# Patient Record
(demographics unavailable — no encounter records)

---

## 2024-10-23 NOTE — HISTORY OF PRESENT ILLNESS
[(Patient denies any chest pain, claudication, dyspnea on exertion, orthopnea, palpitations or syncope)] : Patient denies any chest pain, claudication, dyspnea on exertion, orthopnea, palpitations or syncope [Aortic Stenosis] : no aortic stenosis [Atrial Fibrillation] : no atrial fibrillation [Coronary Artery Disease] : no coronary artery disease [Recent Myocardial Infarction] : no recent myocardial infarction [Implantable Device/Pacemaker] : no implantable device/pacemaker [Asthma] : no asthma [COPD] : no COPD [Sleep Apnea] : no sleep apnea [Smoker] : not a smoker [Self] : no previous adverse anesthesia reaction [Chronic Anticoagulation] : no chronic anticoagulation [Chronic Kidney Disease] : no chronic kidney disease [Diabetes] : no diabetes [FreeTextEntry1] : R knee replacement  [FreeTextEntry2] : 10/30/2024 [FreeTextEntry3] : Dr. Palmer [FreeTextEntry4] : JIMBO VILLAGOMEZ is a 59 year F who presents for preop PMHx HTN, anxiety, b/l knee OA, SNHL b/l Feels well overall S/p PST 10/16  [FreeTextEntry8] : able to perform >4 METS

## 2024-10-23 NOTE — ASSESSMENT
[FreeTextEntry4] : -planned for R knee replacement 10/30 -PST labs 10/16 reviewed- cbc, bmp, a1c, ua/ucx wnl -ekg today SR -no prior adverse effects to anesthesia -able to perform >4METS -RCRI = 0 -to avoid asa, nsaids and vitamins for 1 week prior to surgery  Patient is medically optimized for planned procedure with routine hemodynamic monitoring

## 2025-01-15 NOTE — DISCUSSION/SUMMARY
[FreeTextEntry1] : In a summary Amairani Alejandro is a middle aged female with Hypertension, controlled. Continue Metoprolol and 2 gm sodium diet. LDL goal less than 130 g/dL. Start exercises.  Continue healthy lifestyle. Follow up in 6 months..

## 2025-01-15 NOTE — HISTORY OF PRESENT ILLNESS
[FreeTextEntry1] : Amairani Alejandro is a 58-year-old female with Hypertension comes for follow up visit. Denies any chest pains or palpitations.  No Shortness of breath on exertion. Compliant to medications and diet. Physically active.

## 2025-01-15 NOTE — REVIEW OF SYSTEMS
[Joint Pain] : joint pain [Under Stress] : under stress [Negative] : Respiratory [Blurry Vision] : no blurred vision [Dyspnea on exertion] : not dyspnea during exertion [Chest Discomfort] : no chest discomfort [Lower Ext Edema] : no extremity edema [Palpitations] : no palpitations [Orthopnea] : no orthopnea [PND] : no PND [Abdominal Pain] : no abdominal pain [Nausea] : no nausea [Vomiting] : no vomiting [Heartburn] : no heartburn [Rash] : no rash [Itching] : no itching [Dizziness] : no dizziness [Tremor] : no tremor was seen [Numbness (Hypoesthesia)] : no numbness [Tingling (Paresthesia)] : no tingling [Confusion] : no confusion was observed [Depression] : no depression [Anxiety] : no anxiety [Easy Bleeding] : no tendency for easy bleeding [Easy Bruising] : no tendency for easy bruising

## 2025-03-19 NOTE — HISTORY OF PRESENT ILLNESS
[de-identified] : Patient is a 59 year old female who presents today for an initial evaluation of chronic low back pain. She states that she has had neck pain and midback pain recently. Denies UE sxs. She reports that she has issues with dropping objects, but denies issues with balance. She has left LE pain. She would be able to walk five blocks.

## 2025-03-19 NOTE — PHYSICAL EXAM
[de-identified] :  Cervical Physical Exam   Gait - Normal   Station - Normal   Sagittal balance - Normal   Compensatory mechanism? - None   Horizontal gaze - Maintained   difficulty with toe/heel walk   Reflexes Biceps - Normal Triceps - Normal Brachioradialis - Normal Patellar - Normal Gastroc - Normal Clonus -No   Hoffmans - pos on the right   Shoulder Exam - Normal   Spurlings - None   Wrist Pulses -2+ radial/ulnar   Foot Pulses -2+ DP/PT   Cervical range of motion - Normal   Sensation C5-T1 sensation intact to light touch bilaterally   L1-S1 sensation intact to light touch bilaterally   Motor   Deltoid Biceps Triceps WF WE IO  Right 3+/5 5/5 5/5 5/5 5/5 5/5 5/5 Left 3+/5 5/5 5/5 5/5 5/5 5/5 5/5   IP Quad HS TA Gastroc EHL Right 3+/5 5/5 5/5 5/5 5/5 5/5 Left 3+/5 5/5 5/5 5/5 5/5 5/5 [de-identified] : scoli rads facet arthropathy disc degeneration  L3-L4, L4-L5  Spondylolisthesis degenerative scoliosis SVA wnl severe arthritis bilateral hips

## 2025-03-19 NOTE — ASSESSMENT
[FreeTextEntry1] : I had a lengthy discussion with the patient in regards to their treatment plan and diagnosis.  They do have objective weakness findings on my exam.  Their symptoms have persisted despite the conservative management they have attempted thus far.  As a result I would like to proceed with a cervical and lumbar MRI.  In tandem with this they should begin a physical therapy/home therapy program.  The patient can take Tizanidine and Tylenol as needed for pain control if medically able to.  I will have the patient follow-up in 3 to 4 weeks for repeat clinical evaluation.  I encouraged them to follow-up sooner if their symptoms worsen or change in any way.

## 2025-05-19 NOTE — PHYSICAL EXAM
[de-identified] :  Cervical Physical Exam   Gait - Normal   Station - Normal   Sagittal balance - Normal   Compensatory mechanism? - None   Horizontal gaze - Maintained     Reflexes Biceps - Normal Triceps - Normal Brachioradialis - Normal Patellar - Normal Gastroc - Normal Clonus -No   Hoffmans - pos on the right   Shoulder Exam - Normal   Spurlings - None   Wrist Pulses -2+ radial/ulnar   Foot Pulses -2+ DP/PT   Cervical range of motion - Normal   Sensation C5-T1 sensation intact to light touch bilaterally   L1-S1 sensation intact to light touch bilaterally   Motor   Deltoid Biceps Triceps WF WE IO  Right 3+/5 5/5 5/5 5/5 5/5 5/5 5/5 Left 3+/5 5/5 5/5 5/5 5/5 5/5 5/5   IP Quad HS TA Gastroc EHL Right 3+/5 5/5 5/5 5/5 5/5 5/5 Left 3+/5 5/5 5/5 5/5 5/5 5/5 [de-identified] : lumbar MRI moderate lateral recess stenosis at L4-L5  cervical MRI no critical areas of stenosis  previous imaging: scoli rads facet arthropathy disc degeneration  L3-L4, L4-L5  Spondylolisthesis degenerative scoliosis SVA wnl severe arthritis bilateral hips

## 2025-05-19 NOTE — HISTORY OF PRESENT ILLNESS
[de-identified] : Patient is a 59 year old female who presents today for an evaluation of chronic low back pain. She reports that she has decreased pain from her previous visit. She has been attending physical therapy which has been helpful. She states that she has neck pain, low back pain, and left LE pain. She is scheduled for a left knee replacement.  03.19.25 Patient is a 59 year old female who presents today for an initial evaluation of chronic low back pain. She states that she has had neck pain and midback pain recently. Denies UE sxs. She reports that she has issues with dropping objects, but denies issues with balance. She has left LE pain. She would be able to walk five blocks.

## 2025-05-19 NOTE — ADDENDUM
[FreeTextEntry1] :  I, Luna Quijano, acted solely as a scribe for Dr. Jared Duarte MD on this date 05/19/2025    All medical record entries made by the Scribe were at my, Dr. Jared Duarte MD., direction and personally dictated by me on 05/19/2025 . I have reviewed the chart and agree that the record accurately reflects my personal performance of the history, physical exam, assessment and plan. I have also personally directed, reviewed, and agreed with the chart.

## 2025-05-19 NOTE — ASSESSMENT
[FreeTextEntry1] :  I had a lengthy discussion with the patient in regards to treatment plan and diagnosis. There are no red flag findings on imaging nor are there any red flag findings on clinical exams.  Therefore we will proceed with a course of conservative treatment. This would include physical therapy/home exercise program, Tylenol, NSAIDs as medically indicated.  The patient will follow up with me as needed.  I encouraged the patient to follow-up sooner if there are any new or worsening symptoms.

## 2025-06-04 NOTE — ASSESSMENT
[Vaccines Reviewed] : Immunizations reviewed today. Please see immunization details in the vaccine log within the immunization flowsheet.  [FreeTextEntry1] : Health Care Maintenance - well visit - routine labs ordered - depression screen negative - ekg- follows with cardio Dr. Montelongo - pap smear 5/2024 - mammogram - colonoscopy 5/2024 GI Dr. Zavala, 1 polyp, reports recommended repeat in 5-7 years - flu vaccine- recommend annually - covid vaccines s/p 3 doses, declines further doses  - tdap >10 years, recommend - shingles vaccine- recommend - pneumonia vaccine- recommend  - advised to get annual eye exams with ophthalmology, skin exams with dermatology, and dental exams  Chest discomfort -likely muscular as positional -ekg as above -supportive care discussed  HTN -bp elevated but improved on repeat  -c/w metoprolol qd  Anxiety -advised to discuss off label ubrelvy use with neuro Dr. Rivas  Dysphagia -resolved   RTC for MCA 7/2025

## 2025-06-04 NOTE — HEALTH RISK ASSESSMENT
[Yes] : Yes [Monthly or less (1 pt)] : Monthly or less (1 point) [1 or 2 (0 pts)] : 1 or 2 (0 points) [Never (0 pts)] : Never (0 points) [No] : In the past 12 months have you used drugs other than those required for medical reasons? No [0] : 2) Feeling down, depressed, or hopeless: Not at all (0) [PHQ-2 Negative - No further assessment needed] : PHQ-2 Negative - No further assessment needed [Never] : Never [With Family] : lives with family [Employed] : employed [] :  [# Of Children ___] : has [unfilled] children [Feels Safe at Home] : Feels safe at home [Fully functional (bathing, dressing, toileting, transferring, walking, feeding)] : Fully functional (bathing, dressing, toileting, transferring, walking, feeding) [Fully functional (using the telephone, shopping, preparing meals, housekeeping, doing laundry, using] : Fully functional and needs no help or supervision to perform IADLs (using the telephone, shopping, preparing meals, housekeeping, doing laundry, using transportation, managing medications and managing finances) [Audit-CScore] : 1 [OIE1Aeouc] : 0 [MammogramDate] : 12/24 [MammogramComments] : w/ US ([facility]) [PapSmearDate] : 05/24 [ColonoscopyDate] : 05/24 [ColonoscopyComments] : GI Dr. 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

## 2025-06-04 NOTE — HISTORY OF PRESENT ILLNESS
[FreeTextEntry1] : cpe [de-identified] : JIMBO VILLAGOMEZ is a 59 year F who presents for CPE PMHx HTN, anxiety, b/l knee OA, SNHL b/l  1. L knee TKR planned for 7/30 Dr. Palmer, s/p R TKR fall 2024 and tolerated well.  2. Reports intermittent chest discomfort since this past weekend; onset as she was sitting up from the chair after getting her hair washed at the hairdresser. Worse with certain positions. Denies SOB, palpitations.  3. States she previously saw neuro Dr. Manuel Rivas who gave her a few samples of ubrelvy to trial prn for anxiety; states she was told it was off label use and reports it helped.   Otherwise feels well  Gyn Dr. [place] Cardio Dr. Montelongo

## 2025-07-14 NOTE — DISCUSSION/SUMMARY
[FreeTextEntry1] : In a summary Amairani Alejandro is a middle aged female with Hypertension, controlled. Continue Metoprolol and 2 gm sodium diet. LDL goal less than 130 g/dL. Start exercises. Echo done showed normal LV systolic function and wall motion. Echo results explained.  Continue healthy lifestyle. Follow up in 6 months.

## 2025-07-16 NOTE — HISTORY OF PRESENT ILLNESS
[(Patient denies any chest pain, claudication, dyspnea on exertion, orthopnea, palpitations or syncope)] : Patient denies any chest pain, claudication, dyspnea on exertion, orthopnea, palpitations or syncope [Aortic Stenosis] : no aortic stenosis [Atrial Fibrillation] : no atrial fibrillation [Coronary Artery Disease] : no coronary artery disease [Recent Myocardial Infarction] : no recent myocardial infarction [Implantable Device/Pacemaker] : no implantable device/pacemaker [Asthma] : no asthma [COPD] : no COPD [Sleep Apnea] : no sleep apnea [Smoker] : not a smoker [Self] : no previous adverse anesthesia reaction [Chronic Anticoagulation] : no chronic anticoagulation [Chronic Kidney Disease] : no chronic kidney disease [Diabetes] : no diabetes [FreeTextEntry1] : L knee TKR [FreeTextEntry2] : 7/30/2025 [FreeTextEntry3] : Dr. Palmer  [FreeTextEntry4] : JIMBO VILLAGOMEZ is a 59 year F who presents for preop PMHx HTN, anxiety, b/l knee OA, SNHL b/l Feels well overall  [FreeTextEntry7] : TTE 7/2025; unremarkable  [FreeTextEntry8] : able to perform >4 METS

## 2025-07-16 NOTE — ASSESSMENT
[FreeTextEntry4] : -planned for L knee TKR  7/30/2025 -labs reviewed from 6/2025, prediabetic, otherwise unremarkable  -ekg 6/2025 sinus emeka 57bpm, patient asymptomatic with no active ischemia, no decompensated heart failure, no unstable arrhythmia, and no severe stenotic valvular disease -TTE 6/2025 grossly unremarkable  -no prior adverse effects to anesthesia -able to perform >4METS -RCRI = 0 -to avoid asa, nsaids and vitamins for 1 week prior to surgery  HTN -bp elevated but improved on repeat -c/w toprol 25mg QD  No absolute contraindication to proceed with planned intermediate risk procedure with routine hemodynamic monitoring.